# Patient Record
Sex: MALE | Race: BLACK OR AFRICAN AMERICAN | NOT HISPANIC OR LATINO
[De-identification: names, ages, dates, MRNs, and addresses within clinical notes are randomized per-mention and may not be internally consistent; named-entity substitution may affect disease eponyms.]

---

## 2020-04-25 ENCOUNTER — MESSAGE (OUTPATIENT)
Age: 30
End: 2020-04-25

## 2023-04-19 ENCOUNTER — EMERGENCY (EMERGENCY)
Facility: HOSPITAL | Age: 33
LOS: 1 days | Discharge: HOME | End: 2023-04-19
Attending: EMERGENCY MEDICINE
Payer: COMMERCIAL

## 2023-04-19 VITALS
DIASTOLIC BLOOD PRESSURE: 80 MMHG | SYSTOLIC BLOOD PRESSURE: 145 MMHG | OXYGEN SATURATION: 92 % | WEIGHT: 250 LBS | RESPIRATION RATE: 16 BRPM | TEMPERATURE: 98 F | HEART RATE: 100 BPM | HEIGHT: 69 IN

## 2023-04-19 LAB
ALBUMIN SERPL ELPH-MCNC: 4.1 G/DL — SIGNIFICANT CHANGE UP (ref 3.5–5)
ALP SERPL-CCNC: 70 U/L — SIGNIFICANT CHANGE UP (ref 40–120)
ALT FLD-CCNC: 50 U/L DA — SIGNIFICANT CHANGE UP (ref 10–60)
ANION GAP SERPL CALC-SCNC: 9 MMOL/L — SIGNIFICANT CHANGE UP (ref 5–17)
AST SERPL-CCNC: 26 U/L — SIGNIFICANT CHANGE UP (ref 10–40)
BASOPHILS # BLD AUTO: 0.07 K/UL — SIGNIFICANT CHANGE UP (ref 0–0.2)
BASOPHILS NFR BLD AUTO: 1 % — SIGNIFICANT CHANGE UP (ref 0–2)
BILIRUB SERPL-MCNC: 0.4 MG/DL — SIGNIFICANT CHANGE UP (ref 0.2–1.2)
BUN SERPL-MCNC: 27 MG/DL — HIGH (ref 7–18)
CALCIUM SERPL-MCNC: 9.1 MG/DL — SIGNIFICANT CHANGE UP (ref 8.4–10.5)
CHLORIDE SERPL-SCNC: 107 MMOL/L — SIGNIFICANT CHANGE UP (ref 96–108)
CO2 SERPL-SCNC: 22 MMOL/L — SIGNIFICANT CHANGE UP (ref 22–31)
CREAT SERPL-MCNC: 1.64 MG/DL — HIGH (ref 0.5–1.3)
EGFR: 57 ML/MIN/1.73M2 — LOW
EOSINOPHIL # BLD AUTO: 0.16 K/UL — SIGNIFICANT CHANGE UP (ref 0–0.5)
EOSINOPHIL NFR BLD AUTO: 2.3 % — SIGNIFICANT CHANGE UP (ref 0–6)
GLUCOSE SERPL-MCNC: 162 MG/DL — HIGH (ref 70–99)
HCT VFR BLD CALC: 48.7 % — SIGNIFICANT CHANGE UP (ref 39–50)
HGB BLD-MCNC: 15.9 G/DL — SIGNIFICANT CHANGE UP (ref 13–17)
IMM GRANULOCYTES NFR BLD AUTO: 0.3 % — SIGNIFICANT CHANGE UP (ref 0–0.9)
LYMPHOCYTES # BLD AUTO: 3.82 K/UL — HIGH (ref 1–3.3)
LYMPHOCYTES # BLD AUTO: 55.4 % — HIGH (ref 13–44)
MCHC RBC-ENTMCNC: 27.2 PG — SIGNIFICANT CHANGE UP (ref 27–34)
MCHC RBC-ENTMCNC: 32.6 GM/DL — SIGNIFICANT CHANGE UP (ref 32–36)
MCV RBC AUTO: 83.2 FL — SIGNIFICANT CHANGE UP (ref 80–100)
MONOCYTES # BLD AUTO: 0.6 K/UL — SIGNIFICANT CHANGE UP (ref 0–0.9)
MONOCYTES NFR BLD AUTO: 8.7 % — SIGNIFICANT CHANGE UP (ref 2–14)
NEUTROPHILS # BLD AUTO: 2.22 K/UL — SIGNIFICANT CHANGE UP (ref 1.8–7.4)
NEUTROPHILS NFR BLD AUTO: 32.3 % — LOW (ref 43–77)
NRBC # BLD: 0 /100 WBCS — SIGNIFICANT CHANGE UP (ref 0–0)
PLATELET # BLD AUTO: 322 K/UL — SIGNIFICANT CHANGE UP (ref 150–400)
POTASSIUM SERPL-MCNC: 3.7 MMOL/L — SIGNIFICANT CHANGE UP (ref 3.5–5.3)
POTASSIUM SERPL-SCNC: 3.7 MMOL/L — SIGNIFICANT CHANGE UP (ref 3.5–5.3)
PROT SERPL-MCNC: 7.5 G/DL — SIGNIFICANT CHANGE UP (ref 6–8.3)
RBC # BLD: 5.85 M/UL — HIGH (ref 4.2–5.8)
RBC # FLD: 12.2 % — SIGNIFICANT CHANGE UP (ref 10.3–14.5)
SODIUM SERPL-SCNC: 138 MMOL/L — SIGNIFICANT CHANGE UP (ref 135–145)
TROPONIN I, HIGH SENSITIVITY RESULT: 4.1 NG/L — SIGNIFICANT CHANGE UP
WBC # BLD: 6.89 K/UL — SIGNIFICANT CHANGE UP (ref 3.8–10.5)
WBC # FLD AUTO: 6.89 K/UL — SIGNIFICANT CHANGE UP (ref 3.8–10.5)

## 2023-04-19 PROCEDURE — 96374 THER/PROPH/DIAG INJ IV PUSH: CPT

## 2023-04-19 PROCEDURE — 85025 COMPLETE CBC W/AUTO DIFF WBC: CPT

## 2023-04-19 PROCEDURE — 99284 EMERGENCY DEPT VISIT MOD MDM: CPT | Mod: 25

## 2023-04-19 PROCEDURE — 93005 ELECTROCARDIOGRAM TRACING: CPT

## 2023-04-19 PROCEDURE — 84484 ASSAY OF TROPONIN QUANT: CPT

## 2023-04-19 PROCEDURE — 36415 COLL VENOUS BLD VENIPUNCTURE: CPT

## 2023-04-19 PROCEDURE — 99285 EMERGENCY DEPT VISIT HI MDM: CPT

## 2023-04-19 PROCEDURE — 80053 COMPREHEN METABOLIC PANEL: CPT

## 2023-04-19 RX ORDER — SODIUM CHLORIDE 9 MG/ML
1000 INJECTION INTRAMUSCULAR; INTRAVENOUS; SUBCUTANEOUS ONCE
Refills: 0 | Status: COMPLETED | OUTPATIENT
Start: 2023-04-19 | End: 2023-04-19

## 2023-04-19 RX ADMIN — Medication 125 MILLIGRAM(S): at 22:26

## 2023-04-19 RX ADMIN — SODIUM CHLORIDE 1000 MILLILITER(S): 9 INJECTION INTRAMUSCULAR; INTRAVENOUS; SUBCUTANEOUS at 22:26

## 2023-04-19 NOTE — ED PROVIDER NOTE - OBJECTIVE STATEMENT
32-year-old man with a past medical history of seasonal allergies reporting severe diffuse hives today.  He went to a local pharmacy and took Benadryl for the symptoms not long after taking the Benadryl began feeling lightheaded and like he was going to faint.  Does report having a similar episode like this 1 time in the past after taking Benadryl.  He is denying any associated chest pains or shortness of breath.  He is denying any associated abdominal pain nausea or vomiting.  He is denying any associated throat closing or swelling.  He was not feeling lightheaded prior to taking the Benadryl.

## 2023-04-19 NOTE — ED PROVIDER NOTE - CARE PLAN
1 Principal Discharge DX:	Sinus bradycardia  Secondary Diagnosis:	Urticaria  Secondary Diagnosis:	Adverse reaction to drug

## 2023-04-19 NOTE — ED PROVIDER NOTE - NSFOLLOWUPINSTRUCTIONS_ED_ALL_ED_FT
Thank you for choosing [facility] for your health care.    You were seen in the emergency room for hives and then developed a likely reaction to Benadryl.  You were given steroids for the hives and IV fluids and felt improved.  Your blood work did not show any emergently concerning findings however you do have elevated kidney function and we recommend you follow-up with a primary care doctor to determine why and what you can do for your long-term health.  We gave you prescription for steroids to take daily for the next few days to prevent recurrence of the hives.  We would recommend avoiding Benadryl in the future and choosing a different antihistamine for example Claritin or Zyrtec if needed for allergy symptoms as these do not usually come with the potential side effect of the low heart rate like you experienced tonight.  Please return to the emergency room for any further emergent or concerning medical issues. Thank you for choosing [facility] for your health care.    You were seen in the emergency room for hives and then developed a likely reaction to Benadryl.  You were given steroids for the hives and IV fluids and felt improved.  Your blood work did not show any emergently concerning findings however you do have elevated kidney function and we recommend you follow-up with a primary care doctor to determine why and what you can do for your long-term health.  We gave you prescription for steroids to take daily for the next few days to prevent recurrence of the hives.  We would recommend avoiding Benadryl in the future and choosing a different antihistamine for example Claritin or Zyrtec if needed for allergy symptoms as these do not usually come with the potential side effect of the low heart rate like you experienced tonight.  Please return to the emergency room for any further emergent or concerning medical issues. Thank you for choosing [facility] for your health care.    You were seen in the emergency room for hives and then developed a likely reaction to Benadryl.  You were given steroids for the hives and IV fluids and felt improved.  Your blood work did not show any emergently concerning findings however you do have elevated kidney function and we recommend you follow-up with a primary care doctor to determine why and what you can do for your long-term health.  We gave you prescription for steroids to take daily for the next few days to prevent recurrence of the hives.  We would recommend avoiding Benadryl in the future and choosing a different antihistamine for example Claritin or Zyrtec if needed for allergy symptoms as these do not usually come with the potential side effect of the low heart rate like you experienced tonight.  Please return to the emergency room for any further emergent or concerning medical issues.

## 2023-04-19 NOTE — ED PROVIDER NOTE - NSFOLLOWUPCLINICS_GEN_ALL_ED_FT
[facility]  Internal Medicine  [address] [place] [address]  Phone: [phone]  Fax: [phone]     [facility]  Internal Medicine  [address] [place] [address]  Phone: [phone]  Fax: [phone]     [facility]  Internal Medicine  [address] [place] [address]  Phone: [phone]  Fax: [phone]

## 2023-04-19 NOTE — ED PROVIDER NOTE - PHYSICAL EXAMINATION
Exam:  General: Patient well appearing, vital signs within normal limits  HEENT: airway patent with moist mucous membranes  Cardiac: intermittent bradycardia (sinus) on monitor, S1/S2 with strong peripheral pulses  Respiratory: lungs clear without respiratory distress  GI: abdomen soft, non tender, non distended  Neuro: no gross neurologic deficits  Skin: warm, well perfused, diffuse urticaria  Psych: normal mood and affect

## 2023-04-19 NOTE — ED PROVIDER NOTE - CLINICAL SUMMARY MEDICAL DECISION MAKING FREE TEXT BOX
Patient presenting with near syncope/bradycardia following benadryl for urticaria.  Suspect possible side effect of benadryl as cause of bradycardia/near syncope.  Cause of urticaria possibly seasonal allergies.  Will obtain screening labs, treat with intravenous fluids and steroids for allergic reaction.  Monitor HR in Emergency Department and intervene for sustained bradycardia.  Disposition to be determined.

## 2023-04-19 NOTE — ED PROVIDER NOTE - PATIENT PORTAL LINK FT
You can access the FollowMyHealth Patient Portal offered by [facility] by registering at the following website: [email or web address]. By joining [employer]’s FollowMyHealth portal, you will also be able to view your health information using other applications (apps) compatible with our system. You can access the FollowMyHealth Patient Portal offered by [facility] by registering at the following website: [email or web address]. By joining [employer]’s FollowMyHealth portal, you will also be able to view your health information using other applications (apps) compatible with our system. You can access the FollowMyHealth Patient Portal offered by [facility] by registering at the following website: [email or web address]. By joining [email or web address]’s FollowMyHealth portal, you will also be able to view your health information using other applications (apps) compatible with our system.

## 2023-04-19 NOTE — ED PROVIDER NOTE - PROGRESS NOTE DETAILS
Patient reporting feeling symptomatically improved. Patient reporting all symptoms improved/resolving, suspect side effect of benadryl, discussed elevated kidney function and recommended PMD follow up, prednisone prescribed.

## 2023-04-19 NOTE — ED ADULT NURSE NOTE - OBJECTIVE STATEMENT
PT presents for allergic reaction while at work. PT reports he has multiple allergens none are medication. Reports he started getting hives, felt dizzy, and became sob. LAbs drawn and sent, pt reports he took benadryl. IV solumedrol given in ED pt aaox4 nad vss

## 2023-04-20 VITALS
RESPIRATION RATE: 18 BRPM | SYSTOLIC BLOOD PRESSURE: 112 MMHG | OXYGEN SATURATION: 97 % | DIASTOLIC BLOOD PRESSURE: 68 MMHG | HEART RATE: 83 BPM | TEMPERATURE: 98 F

## 2024-03-11 ENCOUNTER — NON-APPOINTMENT (OUTPATIENT)
Age: 34
End: 2024-03-11